# Patient Record
Sex: FEMALE | Race: WHITE | NOT HISPANIC OR LATINO | Employment: STUDENT | URBAN - METROPOLITAN AREA
[De-identification: names, ages, dates, MRNs, and addresses within clinical notes are randomized per-mention and may not be internally consistent; named-entity substitution may affect disease eponyms.]

---

## 2017-03-16 DIAGNOSIS — Z30.09 FAMILY PLANNING: ICD-10-CM

## 2017-03-16 NOTE — TELEPHONE ENCOUNTER
Patient called asking for a refill of her birth control Gianvi to be sent to the [facility] pharmacy in her chart. Please call pt and let her know t he outcome please. Thank you

## 2017-03-17 RX ORDER — DROSPIRENONE AND ETHINYL ESTRADIOL 0.02-3(28)
1 KIT ORAL DAILY
Qty: 28 TAB | Refills: 4 | Status: SHIPPED | OUTPATIENT
Start: 2017-03-17 | End: 2017-09-23 | Stop reason: SDUPTHER

## 2017-09-28 RX ORDER — DROSPIRENONE AND ETHINYL ESTRADIOL 0.02-3(28)
KIT ORAL
Qty: 28 TAB | Refills: 1 | Status: SHIPPED | OUTPATIENT
Start: 2017-09-28 | End: 2017-12-06

## 2017-12-06 ENCOUNTER — OFFICE VISIT (OUTPATIENT)
Dept: MEDICAL GROUP | Facility: MEDICAL CENTER | Age: 27
End: 2017-12-06
Payer: COMMERCIAL

## 2017-12-06 ENCOUNTER — PATIENT MESSAGE (OUTPATIENT)
Dept: MEDICAL GROUP | Facility: MEDICAL CENTER | Age: 27
End: 2017-12-06

## 2017-12-06 VITALS
WEIGHT: 160 LBS | HEIGHT: 66 IN | RESPIRATION RATE: 16 BRPM | BODY MASS INDEX: 25.71 KG/M2 | TEMPERATURE: 98.6 F | SYSTOLIC BLOOD PRESSURE: 122 MMHG | HEART RATE: 96 BPM | DIASTOLIC BLOOD PRESSURE: 72 MMHG | OXYGEN SATURATION: 96 %

## 2017-12-06 DIAGNOSIS — Z00.00 ANNUAL PHYSICAL EXAM: ICD-10-CM

## 2017-12-06 DIAGNOSIS — Z30.41 ENCOUNTER FOR SURVEILLANCE OF CONTRACEPTIVE PILLS: ICD-10-CM

## 2017-12-06 DIAGNOSIS — Z23 NEED FOR VACCINATION: ICD-10-CM

## 2017-12-06 DIAGNOSIS — G43.009 MIGRAINE WITHOUT AURA AND WITHOUT STATUS MIGRAINOSUS, NOT INTRACTABLE: ICD-10-CM

## 2017-12-06 PROCEDURE — 99395 PREV VISIT EST AGE 18-39: CPT | Performed by: NURSE PRACTITIONER

## 2017-12-06 RX ORDER — DROSPIRENONE AND ETHINYL ESTRADIOL 0.02-3(28)
1 KIT ORAL DAILY
COMMUNITY
End: 2017-12-06

## 2017-12-06 RX ORDER — IBUPROFEN 800 MG/1
800 TABLET ORAL
Qty: 30 TAB | Refills: 1 | Status: SHIPPED | OUTPATIENT
Start: 2017-12-06

## 2017-12-06 RX ORDER — IBUPROFEN 800 MG/1
800 TABLET ORAL
Qty: 30 TAB | Refills: 1 | Status: SHIPPED | OUTPATIENT
Start: 2017-12-06 | End: 2017-12-06 | Stop reason: SDUPTHER

## 2017-12-06 RX ORDER — LEVONORGESTREL AND ETHINYL ESTRADIOL 0.1-0.02MG
1 KIT ORAL DAILY
Qty: 28 TAB | Refills: 11 | Status: SHIPPED | OUTPATIENT
Start: 2017-12-06 | End: 2018-06-25 | Stop reason: SDUPTHER

## 2017-12-06 RX ORDER — LEVONORGESTREL AND ETHINYL ESTRADIOL 0.1-0.02MG
1 KIT ORAL DAILY
Qty: 28 TAB | Refills: 11 | Status: SHIPPED | OUTPATIENT
Start: 2017-12-06 | End: 2017-12-06 | Stop reason: SDUPTHER

## 2017-12-06 ASSESSMENT — PATIENT HEALTH QUESTIONNAIRE - PHQ9: CLINICAL INTERPRETATION OF PHQ2 SCORE: 0

## 2017-12-06 NOTE — ASSESSMENT & PLAN NOTE
Occurs 3 times per week. Associated with photophobia, and occasional nausea, no vomiting. Denies visual disturbances. Has had facial numbness many years ago. Has used Excedrin migraine or Advil migraine with good relief of symptoms.

## 2017-12-06 NOTE — TELEPHONE ENCOUNTER
From: Ela Louise  To: BRIDGETTE Bullard  Sent: 12/6/2017 1:02 PM PST  Subject: Prescription Question    Hello,    I wanted to see if you sent that prescription for me to  at the [facility]. I went to pick it up and they said they never received anything.     If not, can you please send it to the one on [address]? I'm way closer to that one.     Thank you!     Ela Louise

## 2017-12-06 NOTE — PROGRESS NOTES
Ela Louise is a 27 y.o. female here to establish care and to discuss the following issues:    HPI:    Annual physical exam  Social/Family: Single, in a relationship, no children  Work: [employer]  Diet: Vegetables few times per week. Moderate amount of carbohydrates. Occasional sugar/sweats  Caffeine/Energy Drinks/Soda: Coffee once per week  Exercise: Sporadic exercise  Stress: Low Stress  Sleep: No issues  Depression/Anxiety Concerns: None      Migraine without aura and without status migrainosus, not intractable  Occurs 3 times per week. Associated with photophobia, and occasional nausea, no vomiting. Denies visual disturbances. Has had facial numbness many years ago. Has used Excedrin migraine or Advil migraine with good relief of symptoms.      Current medicines (including changes today)  Current Outpatient Prescriptions   Medication Sig Dispense Refill   • ibuprofen (MOTRIN) 800 MG Tab Take 1 Tab by mouth 1 time daily as needed for Headache (migraine). 30 Tab 1   • levonorgestrel-ethinyl estradiol (AVIANE, ALESSE, LESSINA) 0.1-20 MG-MCG per tablet Take 1 Tab by mouth every day. 28 Tab 11     No current facility-administered medications for this visit.      She  has no past medical history on file.  She  has a past surgical history that includes shoulder arthroscopy.  Social History   Substance Use Topics   • Smoking status: Never Smoker   • Smokeless tobacco: Never Used   • Alcohol use No      Comment: Socially     Social History     Social History Narrative   • No narrative on file     Family History   Problem Relation Age of Onset   • No Known Problems Mother    • No Known Problems Father    • No Known Problems Sister    • Diabetes Paternal Grandmother      Family Status   Relation Status   • Mother Alive   • Father Alive   • Sister Alive   • Paternal Grandmother          ROS  No chest pain, no abdominal pain, no rash.  Positive ROS as per HPI.  All other systems reviewed and are negative       "Objective:     Blood pressure 122/72, pulse 96, temperature 37 °C (98.6 °F), resp. rate 16, height 1.676 m (5' 5.98\"), weight 72.6 kg (160 lb), last menstrual period 11/06/2017, SpO2 96 %, not currently breastfeeding. Body mass index is 25.84 kg/m².  Physical Exam:    Constitutional: Alert, no distress.  Skin: Warm, dry, good turgor, no rashes in visible areas.  Eye: Equal, round and reactive, conjunctiva clear, lids normal.  ENMT: Lips without lesions, good dentition, oropharynx clear.  Neck: Trachea midline, no masses, no thyromegaly. No cervical or supraclavicular lymphadenopathy.  Respiratory: Unlabored respiratory effort, lungs clear to auscultation, no wheezes, no ronchi.  Cardiovascular: Normal S1, S2, no murmur, no edema.  Abdomen: Soft, non-tender, no masses, no hepatosplenomegaly.  Psych: Alert and oriented x3, normal affect and mood.      Assessment and Plan:   The following treatment plan was discussed    1. Annual physical exam  Healthy 27-year-old female.  Patient and I discussed the importance of lifestyle changes, with particular emphasis on decreasing sugar and carbohydrate intake and increasing plant-based nutrition (for the purposes of weight loss, general health, and prevention of chronic illnesses), as well as regular cardiovascular exercise, proper sleep, and stress management. Patient verbalized understanding.      2. Migraine without aura and without status migrainosus, not intractable  Unstable.  Begin using Motrin 800 mg as needed for migraine  Begin keeping a headache diary to evaluate for any food or lifestyle causes of frequent headaches.  - ibuprofen (MOTRIN) 800 MG Tab; Take 1 Tab by mouth 1 time daily as needed for Headache (migraine).  Dispense: 30 Tab; Refill: 1    4. Encounter for surveillance of contraceptive pills  [facility] to see if this helps with migraine frequency.      Records requested.  Followup: Return in about 4 weeks (around 1/3/2018) " for migraine.    I have placed the below orders and discussed them with an approved delegating provider. The MA is performing the below orders under the direction of Dr. Pierre

## 2017-12-06 NOTE — ASSESSMENT & PLAN NOTE
Social/Family: Single, in a relationship, no children  Work: [employer]  Diet: Vegetables few times per week. Moderate amount of carbohydrates. Occasional sugar/sweats  Caffeine/Energy Drinks/Soda: Coffee once per week  Exercise: Sporadic exercise  Stress: Low Stress  Sleep: No issues  Depression/Anxiety Concerns: None

## 2018-02-21 ENCOUNTER — OFFICE VISIT (OUTPATIENT)
Dept: MEDICAL GROUP | Facility: MEDICAL CENTER | Age: 28
End: 2018-02-21
Payer: COMMERCIAL

## 2018-02-21 VITALS
OXYGEN SATURATION: 95 % | WEIGHT: 174 LBS | HEIGHT: 66 IN | TEMPERATURE: 98 F | SYSTOLIC BLOOD PRESSURE: 120 MMHG | DIASTOLIC BLOOD PRESSURE: 70 MMHG | HEART RATE: 96 BPM | BODY MASS INDEX: 27.97 KG/M2 | RESPIRATION RATE: 16 BRPM

## 2018-02-21 DIAGNOSIS — G43.009 MIGRAINE WITHOUT AURA AND WITHOUT STATUS MIGRAINOSUS, NOT INTRACTABLE: ICD-10-CM

## 2018-02-21 PROCEDURE — 99213 OFFICE O/P EST LOW 20 MIN: CPT | Performed by: NURSE PRACTITIONER

## 2018-02-21 NOTE — ASSESSMENT & PLAN NOTE
Symptoms significantly improved after switching birth control as well as increasing exercise and improving diet. Patient has almost completely eliminated dairy from her diet as well. She is now getting headaches about once every two weeks and they are now less severe in nature.

## 2018-02-21 NOTE — PROGRESS NOTES
"Subjective:   Ela Louise is a 27 y.o. female here today for follow-up on migraines:    Migraine without aura and without status migrainosus, not intractable  Symptoms significantly improved after switching birth control as well as increasing exercise and improving diet. Patient has almost completely eliminated dairy from her diet as well. She is now getting headaches about once every two weeks and they are now less severe in nature.        Current medicines (including changes today)  Current Outpatient Prescriptions   Medication Sig Dispense Refill   • ibuprofen (MOTRIN) 800 MG Tab Take 1 Tab by mouth 1 time daily as needed for Headache (migraine). 30 Tab 1   • levonorgestrel-ethinyl estradiol (AVIANE, ALESSE, LESSINA) 0.1-20 MG-MCG per tablet Take 1 Tab by mouth every day. 28 Tab 11     No current facility-administered medications for this visit.      She  has no past medical history on file.    ROS   No chest pain, no shortness of breath, no abdominal pain  Positive ROS as per HPI.  All other systems reviewed and are negative.     Objective:     Blood pressure 120/70, pulse 96, temperature 36.7 °C (98 °F), resp. rate 16, height 1.676 m (5' 5.98\"), weight 78.9 kg (174 lb), SpO2 95 %, not currently breastfeeding. Body mass index is 28.1 kg/m².     Physical Exam:  Constitutional: Alert, no distress.  Skin: Warm, dry, good turgor, no rashes in visible areas.  Eye: Equal, round and reactive, conjunctiva clear, lids normal.  ENMT: Lips without lesions, good dentition, oropharynx clear.  Neck: Trachea midline, no masses, no thyromegaly. No cervical or supraclavicular lymphadenopathy  Respiratory: Unlabored respiratory effort, lungs clear to auscultation, no wheezes, no ronchi.  Cardiovascular: Normal S1, S2, no murmur, no edema.  Abdomen: Soft, non-tender, no masses, no hepatosplenomegaly.  Psych: Alert and oriented x3, normal affect and mood.        Assessment and Plan:   The following treatment plan was " discussed    1. Migraine without aura and without status migrainosus, not intractable  Stable, improved.  Now that headaches are less frequent and less intense in nature and patient has significantly altered lifestyle, patient will continue to keep headache diary and follow up in 3 months to discuss possible dietary causes. Encouraged patient to continue regular exercise, weight loss, and healthy diet choices.    Followup: Return in about 3 months (around 5/21/2018) for migraines.    I have placed the below orders and discussed them with an approved delegating provider. The MA is performing the below orders under the direction of Dr. Pierre

## 2018-06-25 DIAGNOSIS — Z30.41 ENCOUNTER FOR SURVEILLANCE OF CONTRACEPTIVE PILLS: ICD-10-CM

## 2018-06-25 RX ORDER — LEVONORGESTREL AND ETHINYL ESTRADIOL 0.1-0.02MG
1 KIT ORAL DAILY
Qty: 28 TAB | Refills: 11 | Status: SHIPPED | OUTPATIENT
Start: 2018-06-25 | End: 2019-07-10 | Stop reason: SDUPTHER

## 2018-06-25 NOTE — TELEPHONE ENCOUNTER
----- Message from Ela Louise sent at 6/24/2018  7:19 AM PDT -----  Regarding: Prescription Question  Contact: [phone]  Hello,    I was trying to order some more birth control but my prescription shows inactive. Is there a way I can have it reactivated so that I can order it by mail?    Thank you,    Ela Louise

## 2019-07-16 DIAGNOSIS — Z30.41 ENCOUNTER FOR SURVEILLANCE OF CONTRACEPTIVE PILLS: ICD-10-CM

## 2019-07-16 RX ORDER — LEVONORGESTREL AND ETHINYL ESTRADIOL 0.1-0.02MG
1 KIT ORAL DAILY
Qty: 28 TAB | Status: CANCELLED | OUTPATIENT
Start: 2019-07-16

## 2019-10-07 DIAGNOSIS — Z30.41 ENCOUNTER FOR SURVEILLANCE OF CONTRACEPTIVE PILLS: ICD-10-CM

## 2019-10-07 RX ORDER — LEVONORGESTREL AND ETHINYL ESTRADIOL 0.1-0.02MG
KIT ORAL
Qty: 28 TAB | Refills: 2 | Status: SHIPPED | OUTPATIENT
Start: 2019-10-07 | End: 2020-01-02

## 2019-10-07 NOTE — TELEPHONE ENCOUNTER
Refill done. Patient is due for annual appointment. Please have patient schedule.  WILEY Bullard.

## 2019-12-30 DIAGNOSIS — Z30.41 ENCOUNTER FOR SURVEILLANCE OF CONTRACEPTIVE PILLS: ICD-10-CM

## 2020-01-02 RX ORDER — LEVONORGESTREL AND ETHINYL ESTRADIOL 0.1-0.02MG
KIT ORAL
Qty: 28 TAB | Refills: 2 | Status: SHIPPED | OUTPATIENT
Start: 2020-01-02 | End: 2020-02-26 | Stop reason: SDUPTHER

## 2020-03-09 DIAGNOSIS — Z30.41 ENCOUNTER FOR SURVEILLANCE OF CONTRACEPTIVE PILLS: ICD-10-CM

## 2020-03-17 DIAGNOSIS — Z30.41 ENCOUNTER FOR SURVEILLANCE OF CONTRACEPTIVE PILLS: ICD-10-CM

## 2020-03-17 RX ORDER — LEVONORGESTREL AND ETHINYL ESTRADIOL 0.1-0.02MG
KIT ORAL
Qty: 28 TAB | Refills: 5 | Status: SHIPPED | OUTPATIENT
Start: 2020-03-17

## 2020-03-17 NOTE — TELEPHONE ENCOUNTER
Received request via: Patient and Pharmacy    Was the patient seen in the last year in this department? No 2/21/2018     Does the patient have an active prescription (recently filled or refills available) for medication(s) requested? No     MyChart Message from patient:     Hello.    I was able to schedule an appointment to get established in [place] but it got pushed out until May due to coronavirus. I submitted a request for a renewal on my prescription through [employer] as I am unable to  from the pharmacy. I realize I need to see a doctor and I have an appointment scheduled. I would really appreciate if I could get just one more renewal granted.     Please let me know if you need anything else.     Thank you so much.